# Patient Record
Sex: FEMALE | Race: WHITE | NOT HISPANIC OR LATINO | URBAN - METROPOLITAN AREA
[De-identification: names, ages, dates, MRNs, and addresses within clinical notes are randomized per-mention and may not be internally consistent; named-entity substitution may affect disease eponyms.]

---

## 2021-10-04 ENCOUNTER — OFFICE VISIT (OUTPATIENT)
Dept: URBAN - METROPOLITAN AREA CLINIC 107 | Facility: CLINIC | Age: 61
End: 2021-10-04

## 2021-10-04 NOTE — HPI-TODAY'S VISIT:
59 yo woman who has been experiencing upper abdominal pain, who recently underwent CT imaging showing hepatomegaly, hepatic steatosis, and cholelithiasis. Symptoms of RUQ pain improved with dietary modification. LUQ pain improved with the addition of PPI. HIDA scan was normal, with EF of 36%.

## 2021-10-07 ENCOUNTER — WEB ENCOUNTER (OUTPATIENT)
Dept: URBAN - METROPOLITAN AREA CLINIC 113 | Facility: CLINIC | Age: 61
End: 2021-10-07

## 2021-10-08 ENCOUNTER — OFFICE VISIT (OUTPATIENT)
Dept: URBAN - METROPOLITAN AREA CLINIC 113 | Facility: CLINIC | Age: 61
End: 2021-10-08
Payer: COMMERCIAL

## 2021-10-08 ENCOUNTER — DASHBOARD ENCOUNTERS (OUTPATIENT)
Age: 61
End: 2021-10-08

## 2021-10-08 ENCOUNTER — WEB ENCOUNTER (OUTPATIENT)
Dept: URBAN - METROPOLITAN AREA CLINIC 113 | Facility: CLINIC | Age: 61
End: 2021-10-08

## 2021-10-08 VITALS
HEART RATE: 54 BPM | BODY MASS INDEX: 35.08 KG/M2 | RESPIRATION RATE: 20 BRPM | SYSTOLIC BLOOD PRESSURE: 121 MMHG | HEIGHT: 63 IN | DIASTOLIC BLOOD PRESSURE: 78 MMHG | WEIGHT: 198 LBS | TEMPERATURE: 96.9 F

## 2021-10-08 DIAGNOSIS — K80.20 CALCULUS OF GALLBLADDER WITHOUT CHOLECYSTITIS WITHOUT OBSTRUCTION: ICD-10-CM

## 2021-10-08 PROBLEM — 70342003: Status: ACTIVE | Noted: 2021-10-08

## 2021-10-08 PROCEDURE — 99203 OFFICE O/P NEW LOW 30 MIN: CPT | Performed by: NURSE PRACTITIONER

## 2021-10-08 RX ORDER — LEVOTHYROXINE SODIUM 100 UG/1
1 TABLET IN THE MORNING ON AN EMPTY STOMACH TABLET ORAL ONCE A DAY
Status: ACTIVE | COMMUNITY

## 2021-10-08 RX ORDER — FAMOTIDINE 20 MG/1
1 TABLET AT BEDTIME AS NEEDED TABLET, FILM COATED ORAL ONCE A DAY
Status: ACTIVE | COMMUNITY

## 2021-10-08 RX ORDER — LISINOPRIL AND HYDROCHLOROTHIAZIDE 10; 12.5 MG/1; MG/1
1 TABLET TABLET ORAL ONCE A DAY
Status: ACTIVE | COMMUNITY

## 2021-10-08 RX ORDER — DICYCLOMINE HYDROCHLORIDE 10 MG/1
2 CAPSULES CAPSULE ORAL THREE TIMES A DAY
Status: ACTIVE | COMMUNITY

## 2021-10-08 RX ORDER — MELATONIN 10 MG
AS DIRECTED CAPSULE ORAL
Status: ACTIVE | COMMUNITY

## 2021-10-08 NOTE — HPI-TODAY'S VISIT:
This is a 61-year-old female presenting for evaluation of abdominal pain.  She was initially seen by the surgeon, Dr. Sierra Hernandez, for complaints of upper abdominal pain and underwent a CT scan of the abdomen and pelvis with contrast on 9- that was notable for hepatomegaly, hepatic steatosis, cholelithiasis without evidence of cholecystitis, normal appendix, and overall no acute findings.  She underwent HIDA scan on 9/22/2021 which demonstrated normal filling and emptying of the biliary system, gallbladder ejection fraction of 36%.  The abdominal pain is located in the epigastric region towards the right side of the abdomen. Pain can radiate to the back. She had an episode Sunday with right upper abdominal pain radiating to the back that began after eating eggs benedict with hollandaise sauce. Intense pain lasted 3 hours. After the intense pain resolved, she had abdominal soreness, nausea, and dry heaves. There is some heartburn associated with the episodes. Two days ago, she had another episode of intense abdominal pain after eating crackers. This episode was less intense. The pain resolved about an hour. Bowels are irregular. She can alternate between loose stools and constipation.

## 2021-10-14 ENCOUNTER — TELEPHONE ENCOUNTER (OUTPATIENT)
Dept: URBAN - METROPOLITAN AREA CLINIC 113 | Facility: CLINIC | Age: 61
End: 2021-10-14

## 2021-12-08 ENCOUNTER — OFFICE VISIT (OUTPATIENT)
Dept: URBAN - METROPOLITAN AREA CLINIC 113 | Facility: CLINIC | Age: 61
End: 2021-12-08